# Patient Record
Sex: FEMALE | Race: WHITE
[De-identification: names, ages, dates, MRNs, and addresses within clinical notes are randomized per-mention and may not be internally consistent; named-entity substitution may affect disease eponyms.]

---

## 2018-03-21 ENCOUNTER — HOSPITAL ENCOUNTER (EMERGENCY)
Dept: HOSPITAL 17 - NED | Age: 38
Discharge: HOME | End: 2018-03-21
Payer: COMMERCIAL

## 2018-03-21 VITALS
OXYGEN SATURATION: 100 % | TEMPERATURE: 98.7 F | RESPIRATION RATE: 18 BRPM | HEART RATE: 104 BPM | DIASTOLIC BLOOD PRESSURE: 84 MMHG | SYSTOLIC BLOOD PRESSURE: 132 MMHG

## 2018-03-21 DIAGNOSIS — S91.332A: Primary | ICD-10-CM

## 2018-03-21 DIAGNOSIS — Z23: ICD-10-CM
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
## 2018-03-21 DIAGNOSIS — Z72.0: ICD-10-CM

## 2018-03-21 PROCEDURE — 73620 X-RAY EXAM OF FOOT: CPT

## 2018-03-21 PROCEDURE — 99283 EMERGENCY DEPT VISIT LOW MDM: CPT

## 2018-03-21 PROCEDURE — E0113 CRUTCH UNDERARM EACH WOOD: HCPCS

## 2018-03-21 PROCEDURE — 96372 THER/PROPH/DIAG INJ SC/IM: CPT

## 2018-03-21 PROCEDURE — 90471 IMMUNIZATION ADMIN: CPT

## 2018-03-21 PROCEDURE — 90714 TD VACC NO PRESV 7 YRS+ IM: CPT

## 2018-03-21 NOTE — PD
HPI


Chief Complaint:  Musculoskeletal Complaint


Time Seen by Provider:  21:42


Travel History


International Travel<30 days:  No


Contact w/Intl Traveler<30days:  No


Traveled to known affect area:  No





History of Present Illness


HPI


37-year-old white female presents to emergency Department with complaints of a 

plantar puncture wound which occurred 1-2 hours prior to arrival.  She states 

that she was wearing tennis shoes when she stepped on a nail through a board.  

She states that it punctured the plantar surface of her left foot.  She has 

difficulty bearing weight due to pain.  She has not had a tetanus shot over 5 

years.  She denies any allergies.  No numbness, tingling or weakness.  She does 

not feel that it went into the bone.





PFSH


Past Medical History


Medical History:  Denies Significant Hx


Cancer:  No


Cardiovascular Problems:  No


Endocrine:  No


Genitourinary:  No


Immune Disorder:  No


Musculoskeletal:  No


Psychiatric:  No


Reproductive:  No


Respiratory:  No


Tetanus Vaccination:  > 5 Years


Pregnant?:  Not Pregnant





Past Surgical History


*** Narrative Surgical


Cholecystectomy, hysterectomy


Gynecologic Surgery:  Yes (partial hysterectomy)


Hysterectomy:  Yes (partial)





Social History


Alcohol Use:  Yes (OCCASIONALLY)


Tobacco Use:  Yes


Substance Use:  No





Allergies-Medications


(Allergen,Severity, Reaction):  


Coded Allergies:  


     No Known Allergies (Unverified , 2/14/16)


Reported Meds & Prescriptions





Reported Meds & Active Scripts


Active


Cipro (Ciprofloxacin HCl) 500 Mg Tab 500 Mg PO BID 10 Days


Lortab 5 mg/325 mg (Hydrocodone/Acetaminophen 5 mg/325 mg) 1 Tab 1 Tab PO Q6H 

PRN








Review of Systems


General / Constitutional:  No: Fever


Eyes:  No: Visual changes


HENT:  No: Headaches


Cardiovascular:  No: Chest Pain or Discomfort


Respiratory:  No: Shortness of Breath


Gastrointestinal:  No: Abdominal Pain


Genitourinary:  No: Dysuria


Musculoskeletal:  Positive: Limited ROM, Pain, No: Arthralgias, Edema


Skin:  No Rash


Neurologic:  No: Weakness


Psychiatric:  No: Depression


Endocrine:  No: Polydipsia


Hematologic/Lymphatic:  No: Easy Bruising





Physical Exam


Narrative


GENERAL: This is a well-nourished, well-developed patient, in no apparent 

distress.


SKIN: No rashes, ecchymoses or lesions. Warm and dry.


HEAD: Atraumatic. Normocephalic.


EYES: PERRL, EOMI, no discharge or injection. No scleral icterus.


EARS: Clear


NOSE: Nasal turbinates appear normal.


THROAT: Mucosa pink and moist.  Airway patent.


NECK: Trachea midline. supple, moves head freely.


LUNGS: Clear to auscultation.


CV: Regular in rhythm.


ABDOMEN: Soft nontender.


EXT: No clubbing cyanosis or edema.  Patient has a plantar puncture wound 

involving the left foot distal third over the area of the first and second 

metacarpal proximal region.  It appears to be into the soft tissue.  She is 

able to move her toes, ankle and foot freely.  She has intact sensation with 

good distal pulses.  There is no fluctuance or pointing.  No active bleeding.  

Pain at the site is minimal.





Data


Data


Last Documented VS





Vital Signs








  Date Time  Temp Pulse Resp B/P (MAP) Pulse Ox O2 Delivery O2 Flow Rate FiO2


 


3/21/18 17:34 98.7 104 18 132/84 (100) 100   








Orders





 Orders


Foot, Limited (2vws) (3/21/18 )


Tetanus/Diphtheria Tox Adult (Tetanus/Di (3/21/18 22:00)


Ciprofloxacin (Cipro) (3/21/18 22:00)


Ed Discharge Order (3/21/18 21:46)


Crutches (3/21/18 21:47)








MDM


Medical Decision Making


Medical Screen Exam Complete:  Yes


Emergency Medical Condition:  Yes


Medical Record Reviewed:  Yes


Interpretation(s)





Last 24 hours Impressions








Foot X-Ray 3/21/18 0000 Signed





Impressions: 





 Service Date/Time:  Wednesday, March 21, 2018 17:51 - CONCLUSION:  No 

radiopaque 





 foreign body     Lionel Michaels MD  FA[facility]








Differential Diagnosis


Differential diagnoses: Foreign body, open fracture, plantar puncture wound


Narrative Course


Patient has sustained a plantar puncture wound.  The wound is been cleansed and 

dressed by the nursing staff.  Patient's tetanus status updated.  Patient is 

given Cipro 500 mg by mouth.  Patient has been advised of the risks of plantar 

puncture wounds causing high potential for infection verbally states 

understanding.





This is a plantar puncture wound





Diagnosis





 Primary Impression:  


 left plantar puncture wound


Patient Instructions:  General Instructions





***Additional Instructions:  


Rest.


Elevation.


keep clean and dry.


Crutches and nonweightbearing.  Advance to weightbearing as tolerated.


Daily wound care with soap, water and Neosporin.


Three Advil every 6 hours.


Cipro..


Follow-up with a primary care doctor or a podiatrist in the next 2-3 days for 

recheck.


Return to the ER for any problems.


***Med/Other Pt SpecificInfo:  Prescription(s) given, Wound Care


Scripts


Ciprofloxacin (Cipro) 500 Mg Tab


500 MG PO BID for Infection for 10 Days, #20 TAB 0 Refills


   Prov: Ishan Hernandez MD         3/21/18


Disposition:  01 DISCHARGE HOME


Condition:  Stable











Petros Engle Mar 21, 2018 22:57

## 2018-03-21 NOTE — RADRPT
EXAM DATE/TIME:  03/21/2018 17:51 

 

[facility] COMPARISON:     

No previous studies available for comparison.

 

                     

INDICATIONS :     

Left foot pain, foreign body, patient stepped on nail.

                     

 

MEDICAL HISTORY :     

None.          

 

SURGICAL HISTORY :     

None.   

 

ENCOUNTER:     

Initial                                        

 

ACUITY:     

1 day      

 

PAIN SCORE:     

9/10

 

LOCATION:     

Left  foot, base of 1st digit.

 

FINDINGS:     

Two view examination of the left foot demonstrates no soft tissue swelling, dislocation, or fracture.
  The calcaneus is intact.  Bony mineralization is normal.  Minimal plantar spurring.

 

CONCLUSION:     

No radiopaque foreign body

 

 

 

 Lionel Michaels MD FA[facility] on March 21, 2018 at 17:53           

Board Certified Radiologist.

 This report was verified electronically.